# Patient Record
Sex: FEMALE | Race: OTHER | NOT HISPANIC OR LATINO | Employment: UNEMPLOYED | URBAN - METROPOLITAN AREA
[De-identification: names, ages, dates, MRNs, and addresses within clinical notes are randomized per-mention and may not be internally consistent; named-entity substitution may affect disease eponyms.]

---

## 2025-06-14 ENCOUNTER — CLINICAL SUPPORT (OUTPATIENT)
Dept: EMERGENCY MEDICINE | Facility: HOSPITAL | Age: 35
End: 2025-06-14
Payer: COMMERCIAL

## 2025-06-14 ENCOUNTER — HOSPITAL ENCOUNTER (EMERGENCY)
Facility: HOSPITAL | Age: 35
Discharge: PSYCHIATRIC OR SUBSTANCE USE FACILITY | End: 2025-06-15
Attending: EMERGENCY MEDICINE
Payer: COMMERCIAL

## 2025-06-14 VITALS
DIASTOLIC BLOOD PRESSURE: 67 MMHG | OXYGEN SATURATION: 96 % | RESPIRATION RATE: 16 BRPM | TEMPERATURE: 96.7 F | SYSTOLIC BLOOD PRESSURE: 107 MMHG | HEART RATE: 88 BPM

## 2025-06-14 DIAGNOSIS — F20.0 PARANOID SCHIZOPHRENIA (MULTI): Primary | ICD-10-CM

## 2025-06-14 LAB
ALBUMIN SERPL BCP-MCNC: 4.1 G/DL (ref 3.4–5)
ALP SERPL-CCNC: 51 U/L (ref 33–110)
ALT SERPL W P-5'-P-CCNC: 8 U/L (ref 7–45)
AMPHETAMINES UR QL SCN: ABNORMAL
ANION GAP SERPL CALC-SCNC: 8 MMOL/L (ref 10–20)
APAP SERPL-MCNC: <10 UG/ML (ref ?–30)
APPEARANCE UR: ABNORMAL
AST SERPL W P-5'-P-CCNC: 12 U/L (ref 9–39)
ATRIAL RATE: 52 BPM
BARBITURATES UR QL SCN: ABNORMAL
BASOPHILS # BLD AUTO: 0.04 X10*3/UL (ref 0–0.1)
BASOPHILS NFR BLD AUTO: 0.7 %
BENZODIAZ UR QL SCN: ABNORMAL
BILIRUB SERPL-MCNC: 0.3 MG/DL (ref 0–1.2)
BILIRUB UR STRIP.AUTO-MCNC: NEGATIVE MG/DL
BUN SERPL-MCNC: 10 MG/DL (ref 6–23)
BZE UR QL SCN: ABNORMAL
CALCIUM SERPL-MCNC: 9.5 MG/DL (ref 8.6–10.6)
CANNABINOIDS UR QL SCN: ABNORMAL
CHLORIDE SERPL-SCNC: 106 MMOL/L (ref 98–107)
CLUE CELLS SPEC QL WET PREP: NORMAL
CO2 SERPL-SCNC: 28 MMOL/L (ref 21–32)
COLOR UR: YELLOW
CREAT SERPL-MCNC: 1.12 MG/DL (ref 0.5–1.05)
EGFRCR SERPLBLD CKD-EPI 2021: 65 ML/MIN/1.73M*2
EOSINOPHIL # BLD AUTO: 0.15 X10*3/UL (ref 0–0.7)
EOSINOPHIL NFR BLD AUTO: 2.5 %
ERYTHROCYTE [DISTWIDTH] IN BLOOD BY AUTOMATED COUNT: 12.7 % (ref 11.5–14.5)
ETHANOL SERPL-MCNC: <10 MG/DL
FENTANYL+NORFENTANYL UR QL SCN: ABNORMAL
GLUCOSE SERPL-MCNC: 96 MG/DL (ref 74–99)
GLUCOSE UR STRIP.AUTO-MCNC: NORMAL MG/DL
HCT VFR BLD AUTO: 37.9 % (ref 36–46)
HCV AB SER QL: NONREACTIVE
HGB BLD-MCNC: 13.1 G/DL (ref 12–16)
HIV 1+2 AB+HIV1 P24 AG SERPL QL IA: NONREACTIVE
IMM GRANULOCYTES # BLD AUTO: 0.02 X10*3/UL (ref 0–0.7)
IMM GRANULOCYTES NFR BLD AUTO: 0.3 % (ref 0–0.9)
KETONES UR STRIP.AUTO-MCNC: NEGATIVE MG/DL
LEUKOCYTE ESTERASE UR QL STRIP.AUTO: NEGATIVE
LYMPHOCYTES # BLD AUTO: 2.15 X10*3/UL (ref 1.2–4.8)
LYMPHOCYTES NFR BLD AUTO: 35.7 %
MCH RBC QN AUTO: 28.6 PG (ref 26–34)
MCHC RBC AUTO-ENTMCNC: 34.6 G/DL (ref 32–36)
MCV RBC AUTO: 83 FL (ref 80–100)
METHADONE UR QL SCN: ABNORMAL
MONOCYTES # BLD AUTO: 0.61 X10*3/UL (ref 0.1–1)
MONOCYTES NFR BLD AUTO: 10.1 %
NEUTROPHILS # BLD AUTO: 3.05 X10*3/UL (ref 1.2–7.7)
NEUTROPHILS NFR BLD AUTO: 50.7 %
NITRITE UR QL STRIP.AUTO: NEGATIVE
NRBC BLD-RTO: 0 /100 WBCS (ref 0–0)
OPIATES UR QL SCN: ABNORMAL
OXYCODONE+OXYMORPHONE UR QL SCN: ABNORMAL
P AXIS: 67 DEGREES
P OFFSET: 177 MS
P ONSET: 129 MS
PCP UR QL SCN: ABNORMAL
PH UR STRIP.AUTO: 6.5 [PH]
PLATELET # BLD AUTO: 258 X10*3/UL (ref 150–450)
POTASSIUM SERPL-SCNC: 4.3 MMOL/L (ref 3.5–5.3)
PR INTERVAL: 182 MS
PREGNANCY TEST URINE, POC: NEGATIVE
PROT SERPL-MCNC: 7 G/DL (ref 6.4–8.2)
PROT UR STRIP.AUTO-MCNC: NEGATIVE MG/DL
Q ONSET: 220 MS
QRS COUNT: 9 BEATS
QRS DURATION: 84 MS
QT INTERVAL: 408 MS
QTC CALCULATION(BAZETT): 379 MS
QTC FREDERICIA: 388 MS
R AXIS: 106 DEGREES
RBC # BLD AUTO: 4.58 X10*6/UL (ref 4–5.2)
RBC # UR STRIP.AUTO: NEGATIVE MG/DL
SALICYLATES SERPL-MCNC: <3 MG/DL (ref ?–20)
SODIUM SERPL-SCNC: 138 MMOL/L (ref 136–145)
SP GR UR STRIP.AUTO: 1.03
T AXIS: 52 DEGREES
T OFFSET: 424 MS
T VAGINALIS SPEC QL WET PREP: NORMAL
TRICHOMONAS REFLEX COMMENT: NORMAL
UROBILINOGEN UR STRIP.AUTO-MCNC: ABNORMAL MG/DL
VENTRICULAR RATE: 52 BPM
WBC # BLD AUTO: 6 X10*3/UL (ref 4.4–11.3)
WBC VAG QL WET PREP: NORMAL
YEAST VAG QL WET PREP: NORMAL

## 2025-06-14 PROCEDURE — 93005 ELECTROCARDIOGRAM TRACING: CPT

## 2025-06-14 PROCEDURE — 81003 URINALYSIS AUTO W/O SCOPE: CPT | Mod: 59

## 2025-06-14 PROCEDURE — 81025 URINE PREGNANCY TEST: CPT

## 2025-06-14 PROCEDURE — 87491 CHLMYD TRACH DNA AMP PROBE: CPT | Performed by: EMERGENCY MEDICINE

## 2025-06-14 PROCEDURE — 99285 EMERGENCY DEPT VISIT HI MDM: CPT | Performed by: EMERGENCY MEDICINE

## 2025-06-14 PROCEDURE — 80307 DRUG TEST PRSMV CHEM ANLYZR: CPT

## 2025-06-14 PROCEDURE — 99284 EMERGENCY DEPT VISIT MOD MDM: CPT | Performed by: EMERGENCY MEDICINE

## 2025-06-14 PROCEDURE — 86803 HEPATITIS C AB TEST: CPT | Performed by: EMERGENCY MEDICINE

## 2025-06-14 PROCEDURE — 85025 COMPLETE CBC W/AUTO DIFF WBC: CPT

## 2025-06-14 PROCEDURE — 87210 SMEAR WET MOUNT SALINE/INK: CPT | Performed by: EMERGENCY MEDICINE

## 2025-06-14 PROCEDURE — 87661 TRICHOMONAS VAGINALIS AMPLIF: CPT | Performed by: EMERGENCY MEDICINE

## 2025-06-14 PROCEDURE — 87389 HIV-1 AG W/HIV-1&-2 AB AG IA: CPT | Performed by: EMERGENCY MEDICINE

## 2025-06-14 PROCEDURE — 80320 DRUG SCREEN QUANTALCOHOLS: CPT

## 2025-06-14 PROCEDURE — 36415 COLL VENOUS BLD VENIPUNCTURE: CPT

## 2025-06-14 PROCEDURE — 93010 ELECTROCARDIOGRAM REPORT: CPT | Performed by: EMERGENCY MEDICINE

## 2025-06-14 PROCEDURE — 80053 COMPREHEN METABOLIC PANEL: CPT

## 2025-06-14 ASSESSMENT — PAIN DESCRIPTION - ONSET: ONSET: ONGOING

## 2025-06-14 ASSESSMENT — PAIN DESCRIPTION - FREQUENCY: FREQUENCY: CONSTANT/CONTINUOUS

## 2025-06-14 ASSESSMENT — PAIN DESCRIPTION - LOCATION: LOCATION: ABDOMEN

## 2025-06-14 ASSESSMENT — PAIN DESCRIPTION - PROGRESSION: CLINICAL_PROGRESSION: NOT CHANGED

## 2025-06-14 ASSESSMENT — PAIN DESCRIPTION - DESCRIPTORS: DESCRIPTORS: ACHING

## 2025-06-14 ASSESSMENT — PAIN - FUNCTIONAL ASSESSMENT: PAIN_FUNCTIONAL_ASSESSMENT: 0-10

## 2025-06-14 ASSESSMENT — PAIN DESCRIPTION - PAIN TYPE: TYPE: ACUTE PAIN

## 2025-06-14 ASSESSMENT — PAIN SCALES - GENERAL: PAINLEVEL_OUTOF10: 7

## 2025-06-14 NOTE — CONSULTS
"BEHAVIORAL HEALTH INITIAL CONSULTATION NOTE    Referring Provider: Rosaura Hays MD     Consultation information:  Consults   Visit type: Face to face evaluation    HISTORY OF PRESENT ILLNESS:  Dahiana Harris is a 35 y.o. female with a history of Schizoaffective Disorder, Bipolar Type and cannabis use who presented to [facility] ED via police for a psychiatric evaluation. Patient arrived after walking into a police station with her nephew reporting that it was her missing son. In the ED, patient is calm and cooperative. EPAT was consulted for further evaluation.     On assessment, Ms. Harris described a delusion that she had a six month old son that was taken from . The son would now be approximately 20-years-old. However, today she was caring for her 6-year-old nephew when she considered that it might be her son due to similar physical features. Ms. Harris thought that her family has been lying to her, and that her nephew is actually her son. She has filed many police reports about her missing son and was told to come in she located him. After perceiving that she found her son, she went to the police station who then brought her to the hospital. Ms. Harris reported no negative thoughts towards her brother or other family members. She said that she told them that she made a mistake. However, she maintained a belief that her nephew could potentially be her missing son.     Ms. Harris expressed that she has an unknown amount of children and that she is actively trying to become pregnant again. She reported intermittent auditory hallucinations that \"know all of my [her] information.\" Ms. Harris described hearing multiple voices that comment on her surroundings. She denied the hallucinations were command in nature. Ms. Harris also reported visual hallucinations that consist of seeing her missing son. The VH last occurred approximately one month prior to this interview.     Ms. Harris reported no symptoms of " "depression, anxiety, or anne. She described stable sleep, appetite, and motivation. ROS negative for SI and HI. She endorsed daily cannabis use, and no alcohol or illicit substance use.     Collateral: Nelys Matamoros, mother, [phone]  Ms. Matamoros reported that Ms. Harris has been decompensating at home. There have been some insurance issues with her receiving the Haldol Dec MONTE and being non-compliant with oral medications. She stated that Ms. Harris was \"triggered\" today after being around her nephew, which has happened in the past. Ms. Harris has long standing delusions about a missing son and being pregnant.     Past Psychiatric History  Current/Previous Diagnoses: see HPI  Current Psychiatrist/Provider: [facility]  Past Medication Trials: Haldol Dec, prolixin, risperdal, zyprexa, invega  Inpatient Hospitalizations: 10+, last in January 2024  Suicide Attempts: Denies    Substance Abuse History  Tobacco use history: Denies  Alcohol use history: Denies  Cannabis use history: daily use  Illicit Drug Use History: Denies    Social History  Household: lives alone  Has one 21-year-old son   Occupation: on [employer]I    OARRS REVIEW  OARRS checked:     ALLERGIES  Patient has no known allergies.    PSYCHIATRIC REVIEW OF SYSTEMS  Depression: negative  Anxiety: negative  Anne: negative  Psychosis: auditory hallucinations:running commentary, visual hallucinations, paranoia, and delusions:      OBJECTIVE    VITALS      11/17/2020    11:42 AM 1/13/2023     9:00 AM 6/14/2025     1:36 PM   Vitals   Systolic 117 106 107   Diastolic 81 68 67   Heart Rate 69  88   Temp   35.9 °C (96.7 °F)   Resp   16   Height 1.676 m (5' 6\") 1.676 m (5' 6\")    Weight (lb) 154 168    BMI 24.86 kg/m2 27.12 kg/m2    BSA (m2) 1.8 m2 1.88 m2       There is no height or weight on file to calculate BMI.  No height and weight on file for this encounter.  Wt Readings from Last 4 Encounters:   01/13/23 76.2 kg (168 lb)   11/17/20 69.9 kg (154 lb) "     Mental Status Exam  General: NAD. seated comfortably during interview.  Appearance: Appeared as age stated; appropriately dressed/groomed.  Attitude: Calm, cooperative  Behavior: Fair EC; overall responding appropriately  Motor Activity: No notable lisa PMAR  Speech: Clear, with fair phonation, and no lisp nor dysarthria.   Mood: Content  Affect: Euthymic; normal range/intensity; appropriate and congruent  Thought Process: Linear and logical; not perseverating   Thought Content: Denied SI/HI. Voiced delusions per above.  Thought Perception: Did not appear internally stimulated. Reported intermittent A/VH, not current during evaluation  Cognition: Grossly intact; A&O x4/4 to self, place, date, and context.  Insight: Limited  Judgement: Limited    LABS  Recent Results (from the past 6 weeks)   HEMOGLOBIN A1C    Collection Time: 05/14/25  4:09 PM   Result Value Ref Range    Hemoglobin A1C 5.2 4.0 - 5.6 %    Estimated Average Glucose 103 mg/dL   POCT pregnancy, urine    Collection Time: 06/14/25  1:58 PM   Result Value Ref Range    Preg Test, Ur Negative    Urinalysis with Reflex Culture and Microscopic    Collection Time: 06/14/25  1:59 PM   Result Value Ref Range    Color, Urine Yellow Light-Yellow, Yellow, Dark-Yellow    Appearance, Urine Turbid (N) Clear    Specific Gravity, Urine 1.028 1.005 - 1.035    pH, Urine 6.5 5.0, 5.5, 6.0, 6.5, 7.0, 7.5, 8.0    Protein, Urine NEGATIVE NEGATIVE, 10 (TRACE), 20 (TRACE) mg/dL    Glucose, Urine Normal Normal mg/dL    Blood, Urine NEGATIVE NEGATIVE mg/dL    Ketones, Urine NEGATIVE NEGATIVE mg/dL    Bilirubin, Urine NEGATIVE NEGATIVE mg/dL    Urobilinogen, Urine 2 (1+) (A) Normal mg/dL    Nitrite, Urine NEGATIVE NEGATIVE    Leukocyte Esterase, Urine NEGATIVE NEGATIVE   Drug Screen, Urine    Collection Time: 06/14/25  1:59 PM   Result Value Ref Range    Amphetamine Screen, Urine Presumptive Negative Presumptive Negative    Barbiturate Screen, Urine Presumptive Negative  Presumptive Negative    Benzodiazepines Screen, Urine Presumptive Negative Presumptive Negative    Cannabinoid Screen, Urine Presumptive Positive (A) Presumptive Negative    Cocaine Metabolite Screen, Urine Presumptive Negative Presumptive Negative    Fentanyl Screen, Urine Presumptive Negative Presumptive Negative    Opiate Screen, Urine Presumptive Negative Presumptive Negative    Oxycodone Screen, Urine Presumptive Negative Presumptive Negative    PCP Screen, Urine Presumptive Negative Presumptive Negative    Methadone Screen, Urine Presumptive Negative Presumptive Negative   CBC and Auto Differential    Collection Time: 06/14/25  2:08 PM   Result Value Ref Range    WBC 6.0 4.4 - 11.3 x10*3/uL    nRBC 0.0 0.0 - 0.0 /100 WBCs    RBC 4.58 4.00 - 5.20 x10*6/uL    Hemoglobin 13.1 12.0 - 16.0 g/dL    Hematocrit 37.9 36.0 - 46.0 %    MCV 83 80 - 100 fL    MCH 28.6 26.0 - 34.0 pg    MCHC 34.6 32.0 - 36.0 g/dL    RDW 12.7 11.5 - 14.5 %    Platelets 258 150 - 450 x10*3/uL    Neutrophils % 50.7 40.0 - 80.0 %    Immature Granulocytes %, Automated 0.3 0.0 - 0.9 %    Lymphocytes % 35.7 13.0 - 44.0 %    Monocytes % 10.1 2.0 - 10.0 %    Eosinophils % 2.5 0.0 - 6.0 %    Basophils % 0.7 0.0 - 2.0 %    Neutrophils Absolute 3.05 1.20 - 7.70 x10*3/uL    Immature Granulocytes Absolute, Automated 0.02 0.00 - 0.70 x10*3/uL    Lymphocytes Absolute 2.15 1.20 - 4.80 x10*3/uL    Monocytes Absolute 0.61 0.10 - 1.00 x10*3/uL    Eosinophils Absolute 0.15 0.00 - 0.70 x10*3/uL    Basophils Absolute 0.04 0.00 - 0.10 x10*3/uL   Comprehensive Metabolic Panel    Collection Time: 06/14/25  2:08 PM   Result Value Ref Range    Glucose 96 74 - 99 mg/dL    Sodium 138 136 - 145 mmol/L    Potassium 4.3 3.5 - 5.3 mmol/L    Chloride 106 98 - 107 mmol/L    Bicarbonate 28 21 - 32 mmol/L    Anion Gap 8 (L) 10 - 20 mmol/L    Urea Nitrogen 10 6 - 23 mg/dL    Creatinine 1.12 (H) 0.50 - 1.05 mg/dL    eGFR 65 >60 mL/min/1.73m*2    Calcium 9.5 8.6 - 10.6 mg/dL     Albumin 4.1 3.4 - 5.0 g/dL    Alkaline Phosphatase 51 33 - 110 U/L    Total Protein 7.0 6.4 - 8.2 g/dL    AST 12 9 - 39 U/L    Bilirubin, Total 0.3 0.0 - 1.2 mg/dL    ALT 8 7 - 45 U/L   Acute Toxicology Panel, Blood    Collection Time: 06/14/25  2:08 PM   Result Value Ref Range    Acetaminophen <10.0 10.0 - 30.0 ug/mL    Salicylate  <3 4 - 20 mg/dL    Alcohol <10 <=10 mg/dL   HIV 1/2 Antigen/Antibody Screen with Reflex to Confirmation    Collection Time: 06/14/25  2:08 PM   Result Value Ref Range    HIV 1/2 Antigen/Antibody Screen with Reflex to Confirmation Nonreactive Nonreactive   Wet Prep with Trichomonas Reflex If Neg    Collection Time: 06/14/25  2:13 PM   Result Value Ref Range    Trichomonas None Seen None Seen    Clue Cells None Seen None Seen    Yeast None Seen None Seen    WBC 1-2     Trichomonas reflex comment       Trichomonas was not seen by wet prep. Reflex Trichomonas vaginalis by Amplified Detection.     PSYCHIATRIC RISK ASSESSMENT  Violence Risk Factors:  current psychiatric illness and substance abuse   Acute Risk of Harm to Others is Considered: Moderate  Suicide Risk Factors: current psychiatric illness and substance abuse   Protective Factors: positive family relationships and hopefulness/future-orientation  Acute Risk of Harm to Self is Considered: Low    Assessment/Plan   Dahiana Harris is a 35 y.o. female with a history of Schizoaffective Disorder, Bipolar Type and cannabis use who presented to [facility] ED via police for a psychiatric evaluation. Patient arrived after walking into a police station with her nephew reporting that it was her missing son. Per chart review, patient has long standing delusions about a missing son amongst other delusions She reports intermittent auditory hallucinations and visual hallucinations of her missing son. Patient's mother expressed concern that she is decompensating. At this time, Ms. Harris meets criteria for inpatient psychiatric hospitalization. She  presents with a substantial disorder of thought and perception that grossly impairs her judgement and behavior. As a result, she represents a substantial risk of harm to others.     IMPRESSION:  Schizoaffective Disorder, Bipolar Type  Cannabis Use Disorder    RECOMMENDATIONS:  - Patient meets criteria for inpatient psychiatric hospitalization  - Please complete pink slip once placement is found. Patient lacks capacity to leave AMA  - Defer med adjustments to admit team. Haldol Dec 100mg MONTE due on 6/18     Recs relayed to ER provider  Patient to be discussed with attending psychiatrist, Dr. Jacobs

## 2025-06-14 NOTE — ED TRIAGE NOTES
Pt BIB [facility] for a psychiatric evaluation. Per , pt had walked into the police station with her nephew and stated that he looks like her son. Pt stated that her son has been missing since he was 6 months old and he would be around 20 years old now. Pt reports that she has a history of schizophrenia and bipolar disorder, and she takes her medications as prescribed. Pt is guarded but calm and cooperative. Pt denies SI, HI, and A/VH. Pt A&Ox4 with VS WNL.

## 2025-06-15 LAB
C TRACH RRNA SPEC QL NAA+PROBE: NEGATIVE
HOLD SPECIMEN: NORMAL
N GONORRHOEA DNA SPEC QL PROBE+SIG AMP: NEGATIVE

## 2025-06-16 LAB — T VAGINALIS RRNA SPEC QL NAA+PROBE: NEGATIVE
